# Patient Record
Sex: MALE | NOT HISPANIC OR LATINO | URBAN - METROPOLITAN AREA
[De-identification: names, ages, dates, MRNs, and addresses within clinical notes are randomized per-mention and may not be internally consistent; named-entity substitution may affect disease eponyms.]

---

## 2024-10-14 ENCOUNTER — APPOINTMENT (RX ONLY)
Dept: URBAN - METROPOLITAN AREA CLINIC 316 | Facility: CLINIC | Age: 59
Setting detail: DERMATOLOGY
End: 2024-10-14

## 2024-10-14 PROBLEM — D23.9 OTHER BENIGN NEOPLASM OF SKIN, UNSPECIFIED: Status: ACTIVE | Noted: 2024-10-14

## 2024-10-14 PROCEDURE — ? PREVENTATIVE SKIN CANCER SCREENING

## 2024-10-14 PROCEDURE — ? ADDITIONAL NOTES

## 2024-10-14 NOTE — PROCEDURE: ADDITIONAL NOTES
Render Risk Assessment In Note?: no
Additional Notes: gave patient reassurance on the benign nature of the lesions.
Detail Level: Simple

## 2024-10-14 NOTE — PROCEDURE: PREVENTATIVE SKIN CANCER SCREENING
Billing Warning: If you want to bill the  [id number] cpt codes you must manually override the bill.
Detail Level: Simple
Initial Screening Text: Skin cancer screening performed of all exposed skin.  No lesions of concern were identified today.  \\n\\nWe have recommended an annual full body skin exam and daily use of broad spectrum spf 30+ sunscreen.
Initial Vs Subsequent Screenings?: Initial
Initial Screening Text: Skin cancer screening was performed on exposed skin.  Lesions were identified that should be followed up for further evaluation.  \\n\\nWe recommend:  1)  Schedule an appointment to follow-up in the office for further exam/testing  2) Full Body Skin Exam is encouraged annually, 3) Daily use of broad spectrum SPF 30+ Sunscreen.